# Patient Record
Sex: FEMALE | Race: WHITE | NOT HISPANIC OR LATINO | URBAN - METROPOLITAN AREA
[De-identification: names, ages, dates, MRNs, and addresses within clinical notes are randomized per-mention and may not be internally consistent; named-entity substitution may affect disease eponyms.]

---

## 2023-06-14 ENCOUNTER — APPOINTMENT (RX ONLY)
Dept: URBAN - METROPOLITAN AREA CLINIC 332 | Facility: CLINIC | Age: 38
Setting detail: DERMATOLOGY
End: 2023-06-14

## 2023-06-14 DIAGNOSIS — D18.0 HEMANGIOMA: ICD-10-CM

## 2023-06-14 DIAGNOSIS — D22 MELANOCYTIC NEVI: ICD-10-CM

## 2023-06-14 DIAGNOSIS — L82.1 OTHER SEBORRHEIC KERATOSIS: ICD-10-CM

## 2023-06-14 DIAGNOSIS — L81.4 OTHER MELANIN HYPERPIGMENTATION: ICD-10-CM

## 2023-06-14 PROBLEM — D22.5 MELANOCYTIC NEVI OF TRUNK: Status: ACTIVE | Noted: 2023-06-14

## 2023-06-14 PROBLEM — D18.01 HEMANGIOMA OF SKIN AND SUBCUTANEOUS TISSUE: Status: ACTIVE | Noted: 2023-06-14

## 2023-06-14 PROCEDURE — ? TREATMENT REGIMEN

## 2023-06-14 PROCEDURE — ? ADDITIONAL NOTES

## 2023-06-14 PROCEDURE — ? COUNSELING

## 2023-06-14 PROCEDURE — 99213 OFFICE O/P EST LOW 20 MIN: CPT

## 2023-06-14 PROCEDURE — ? FULL BODY SKIN EXAM - DECLINED

## 2023-06-14 ASSESSMENT — LOCATION DETAILED DESCRIPTION DERM
LOCATION DETAILED: LEFT PROXIMAL POSTERIOR UPPER ARM
LOCATION DETAILED: RIGHT PROXIMAL POSTERIOR UPPER ARM
LOCATION DETAILED: SUPERIOR THORACIC SPINE
LOCATION DETAILED: RIGHT SUPERIOR MEDIAL UPPER BACK
LOCATION DETAILED: LEFT MEDIAL UPPER BACK
LOCATION DETAILED: LEFT SUPERIOR UPPER BACK
LOCATION DETAILED: LEFT POSTERIOR SHOULDER
LOCATION DETAILED: INFERIOR THORACIC SPINE
LOCATION DETAILED: RIGHT POSTERIOR SHOULDER
LOCATION DETAILED: UPPER STERNUM

## 2023-06-14 ASSESSMENT — LOCATION SIMPLE DESCRIPTION DERM
LOCATION SIMPLE: LEFT POSTERIOR UPPER ARM
LOCATION SIMPLE: RIGHT UPPER BACK
LOCATION SIMPLE: LEFT UPPER BACK
LOCATION SIMPLE: CHEST
LOCATION SIMPLE: UPPER BACK
LOCATION SIMPLE: RIGHT POSTERIOR UPPER ARM
LOCATION SIMPLE: RIGHT SHOULDER
LOCATION SIMPLE: LEFT SHOULDER

## 2023-06-14 ASSESSMENT — LOCATION ZONE DERM
LOCATION ZONE: TRUNK
LOCATION ZONE: ARM

## 2023-06-14 NOTE — HPI: EVALUATION OF SKIN LESION(S)
normal,  alert,  in no acute distress,  well developed, well nourished,  normal communication ability
Hpi Title: Evaluation of Skin Lesions